# Patient Record
Sex: MALE | Race: BLACK OR AFRICAN AMERICAN | NOT HISPANIC OR LATINO | URBAN - METROPOLITAN AREA
[De-identification: names, ages, dates, MRNs, and addresses within clinical notes are randomized per-mention and may not be internally consistent; named-entity substitution may affect disease eponyms.]

---

## 2018-11-14 ENCOUNTER — EMERGENCY (EMERGENCY)
Facility: HOSPITAL | Age: 23
LOS: 1 days | Discharge: HOME | End: 2018-11-14
Attending: EMERGENCY MEDICINE
Payer: COMMERCIAL

## 2018-11-14 VITALS
RESPIRATION RATE: 18 BRPM | DIASTOLIC BLOOD PRESSURE: 74 MMHG | HEIGHT: 68 IN | OXYGEN SATURATION: 96 % | WEIGHT: 199.96 LBS | TEMPERATURE: 98 F | SYSTOLIC BLOOD PRESSURE: 128 MMHG | HEART RATE: 86 BPM

## 2018-11-14 PROCEDURE — 99283 EMERGENCY DEPT VISIT LOW MDM: CPT

## 2018-11-14 PROCEDURE — 99283 EMERGENCY DEPT VISIT LOW MDM: CPT | Mod: 25

## 2018-11-14 NOTE — ED ADULT TRIAGE NOTE - CHIEF COMPLAINT QUOTE
Patient a change in hearing around 30 minutes ago.  Pt states he is able to hear in his R ear, but states his hearing has become more "fuzzy".  Pt states he took a shower earlier, and thinks he may have gotten some water in his ear.  Pt states if I "pry open the ear it feels better".  Pt states he caught a cold a few days ago

## 2018-11-15 RX ORDER — OFLOXACIN OTIC SOLUTION 3 MG/ML
1 SOLUTION/ DROPS AURICULAR (OTIC) ONCE
Qty: 0 | Refills: 0 | Status: COMPLETED | OUTPATIENT
Start: 2018-11-15 | End: 2018-11-15

## 2018-11-15 RX ADMIN — OFLOXACIN OTIC SOLUTION 1 DROP(S): 3 SOLUTION/ DROPS AURICULAR (OTIC) at 00:28

## 2018-11-15 NOTE — ED ADULT NURSE NOTE - OBJECTIVE STATEMENT
22y/o Male presented to the ED from home with complaint of rigth ear pain. A&Ox3, ambulatory. Patient states that his hearing was fuzzy this morning when he woke up, Patient then took a shower and thinks he got water in his ears. Patient did not try and clean ear out after shower. Reports increase in fuzzy  sounds hrs after shower, states that he used a cindy pin to clean it out and made symptom worse. Repots mild discomfort of right ear. Patient denies fever, chills, N/V/D.

## 2018-11-15 NOTE — ED PROVIDER NOTE - PLAN OF CARE
1) Please return to the ED should you have any new or worsening symptoms, worsening pain, develop pain in ear, skin changes over the ear or just under the ear or any concerning symptoms  2) Please follow up with Ear, Nose, and Throat Physician - please call [phone] to make an appointment.   3) Please place 2-3 drops in R ear until told to discontinue by ear nose and throat MD (Until perforation heals)

## 2018-11-15 NOTE — ED PROVIDER NOTE - MEDICAL DECISION MAKING DETAILS
Gamal Delgado (MD): 24 y/o M pt with no significant PMHx presents to the ED for change in hearing today which worsened after inserting cindy pin. Small TM perforation at 6oclock. Discussed with pt to not to insert foreign body into ear. Plan: decongestion to decrease pressure and ENT f/u

## 2018-11-15 NOTE — ED PROVIDER NOTE - CARE PLAN
Principal Discharge DX:	Perforation of right tympanic membrane  Assessment and plan of treatment:	1) Please return to the ED should you have any new or worsening symptoms, worsening pain, develop pain in ear, skin changes over the ear or just under the ear or any concerning symptoms  2) Please follow up with Ear, Nose, and Throat Physician - please call [phone] to make an appointment.   3) Please place 2-3 drops in R ear until told to discontinue by ear nose and throat MD (Until perforation heals)

## 2018-11-15 NOTE — ED PROVIDER NOTE - ATTENDING CONTRIBUTION TO CARE
I have seen and evaluated this patient with the resident.   I agree with the findings  unless other wise stated.  I have made appropriate changes in documentations where needed, After my face to face bedside evaluation, I am further  [age] y/o M pt with no significant PMHx presents to the ED for change in hearing today which no pain ? trauma no ear d/c no bleeding  worsened after inserting cindy pin. Small TM perforation at 6oclock. Discussed with pt to not to insert foreign body into ear. Plan: decongestion to decrease pressure and ENT f/u --Anderson

## 2018-11-15 NOTE — ED PROVIDER NOTE - OBJECTIVE STATEMENT
Gamal Delgado) : 24 y/o M pt with no significant PMHx c/o change in hearing about 30 minutes ago. States that he is able to hear out of his right ear but notes more "fuzzy" sounding. Took a shower earlier and thinks he may have gotten some water in his ear. States that after his shower, he would usually clean out his ear after he showers but didn't do so this time. Then noted onset of sx started a few hours after the shower. Tried using cindy pin to clean it out which made it slightly worse. Notes that he had pain with swallowing x2 days. Denies fever, ear pain or any other complaints. NKDA

## 2019-05-31 ENCOUNTER — EMERGENCY (EMERGENCY)
Facility: HOSPITAL | Age: 24
LOS: 1 days | Discharge: HOME | End: 2019-05-31
Attending: EMERGENCY MEDICINE
Payer: COMMERCIAL

## 2019-05-31 VITALS
HEIGHT: 72 IN | DIASTOLIC BLOOD PRESSURE: 71 MMHG | TEMPERATURE: 98 F | SYSTOLIC BLOOD PRESSURE: 120 MMHG | OXYGEN SATURATION: 97 % | WEIGHT: 179.9 LBS | RESPIRATION RATE: 16 BRPM | HEART RATE: 70 BPM

## 2019-05-31 PROCEDURE — 99283 EMERGENCY DEPT VISIT LOW MDM: CPT | Mod: 25

## 2019-05-31 PROCEDURE — 94640 AIRWAY INHALATION TREATMENT: CPT

## 2019-05-31 RX ORDER — FLUTICASONE PROPIONATE 50 MCG
2 SPRAY, SUSPENSION NASAL ONCE
Refills: 0 | Status: COMPLETED | OUTPATIENT
Start: 2019-05-31 | End: 2019-05-31

## 2019-05-31 RX ADMIN — Medication 2 SPRAY(S): at 04:06

## 2019-05-31 NOTE — ED PROVIDER NOTE - OBJECTIVE STATEMENT
22 yo male presenting with 4 hour hx of ear fullness and pain on the right side that started while patient was showering.  took ibuprofen with mild transient relief of symptoms, described as fullness, moderate intensity with no radiation.  hx of this in the past.  states that he is dealing with upper respiratory infection.  denies fevers. 22 yo male presenting with 4 hour hx of ear fullness and pain on the right side that started while patient was showering.  took ibuprofen with mild transient relief of symptoms, described as fullness, moderate intensity with no radiation.  hx of this in the past.  states that he is dealing with upper respiratory infection.  denies fevers.    Attendinyo male presents with right ear fullness for 1 day.  feels like he has pressure in the ear that changes with head movement.  no pain with ear pulling.

## 2019-05-31 NOTE — ED PROVIDER NOTE - PHYSICAL EXAMINATION
gen: well appearing  Mentation: AAO x 3  psych: mood appropriate  ENT: airway patent, TM bulging with clear background on the right ear   Eyes: conjunctivae clear bilaterally  Cardio: RRR, no m/r/g  Resp: normal BS b/l  GI: s/nt/nd   MSK: normal movement of all extremities

## 2019-05-31 NOTE — ED ADULT NURSE NOTE - OBJECTIVE STATEMENT
23M c/o hearing loss to R ear. Patient states hx of swimmers ear in the past. Pt states that it feels similar to that incident. Patient requests ear drops. Pt is alert and oriented x4, calm/cooperative, NAD, VSS.

## 2019-05-31 NOTE — ED PROVIDER NOTE - NSFOLLOWUPCLINICS_GEN_ALL_ED_FT
[facility]  ENT  [address], [address]  [place] [address]  Phone: [phone]  Fax:   Follow Up Time: 7-10 Days

## 2019-05-31 NOTE — ED PROVIDER NOTE - NS ED ROS FT
Constitutional: no fever  Eyes: no conjunctivitis  Ears: + ear pain   Nose: no nasal congestion, Mouth/Throat: no throat pain, Neck: no stiffness  Cardiovascular: no chest pain  Chest: no cough  Gastrointestinal: no abdominal pain, no vomiting and diarrhea  MSK: no joint pain  : no dysuria  Skin: no rash  Neuro: no LOC

## 2019-05-31 NOTE — ED PROVIDER NOTE - NSFOLLOWUPINSTRUCTIONS_ED_ALL_ED_FT
Please follow up with an ear nose throat doctor within the next week in regards to your symptoms.  Please employ two nasal sprays into each nostril once a day for the next 5 days.    Drink at least 2 Liters or 64 Ounces of water each day.  Return for any persistent, worsening symptoms, or ANY concerns at all.

## 2020-01-17 ENCOUNTER — EMERGENCY (EMERGENCY)
Facility: HOSPITAL | Age: 25
LOS: 1 days | Discharge: HOME | End: 2020-01-17
Attending: EMERGENCY MEDICINE | Admitting: EMERGENCY MEDICINE
Payer: COMMERCIAL

## 2020-01-17 VITALS
HEART RATE: 87 BPM | TEMPERATURE: 98 F | OXYGEN SATURATION: 100 % | DIASTOLIC BLOOD PRESSURE: 59 MMHG | RESPIRATION RATE: 15 BRPM | SYSTOLIC BLOOD PRESSURE: 109 MMHG

## 2020-01-17 PROCEDURE — 99283 EMERGENCY DEPT VISIT LOW MDM: CPT

## 2020-01-17 NOTE — ED ADULT TRIAGE NOTE - CHIEF COMPLAINT QUOTE
Pt c/o cough/congestion, chills, and generalized body aches for the past two days.  Denies any sick contacts.  Took ibuprofen prior to arrival.  Appears comfortable. Denies any PMHx.

## 2020-01-18 VITALS
HEART RATE: 95 BPM | SYSTOLIC BLOOD PRESSURE: 138 MMHG | TEMPERATURE: 100 F | RESPIRATION RATE: 14 BRPM | DIASTOLIC BLOOD PRESSURE: 81 MMHG | OXYGEN SATURATION: 100 %

## 2020-01-18 PROCEDURE — 71046 X-RAY EXAM CHEST 2 VIEWS: CPT | Mod: 26

## 2020-01-18 RX ORDER — IBUPROFEN 200 MG
600 TABLET ORAL ONCE
Refills: 0 | Status: COMPLETED | OUTPATIENT
Start: 2020-01-18 | End: 2020-01-18

## 2020-01-18 RX ADMIN — Medication 600 MILLIGRAM(S): at 02:13

## 2020-01-18 NOTE — ED PROVIDER NOTE - PHYSICAL EXAMINATION
GENERAL: no acute distress, non-toxic appearing  HEENT: normal conjunctiva, oral mucosa moist, neck supple, nonerythematous oropharynx  CARDIAC: regular rate and rhythm, normal S1 and S2,  no appreciable murmurs  PULM: clear to ascultation bilaterally, no crackles, rales, rhonchi, or wheezing  GI: abdomen nondistended, soft, nontender, no guarding or rebound tenderness  : no CVA tenderness, no suprapubic tenderness  NEURO: alert and oriented x 3, normal speech, PERRL, EOMI, CN3-12 grossly intact, no focal motor or sensory deficits, nonantalgic gait  MSK: no visible deformities, no peripheral edema, calf tenderness/redness/swelling  SKIN: no visible rashes, dry, well-perfused  PSYCH: appropriate mood and affect

## 2020-01-18 NOTE — ED PROVIDER NOTE - CLINICAL SUMMARY MEDICAL DECISION MAKING FREE TEXT BOX
24M with flu-like symptoms. Didn't get flu shot, + sick contacts. Tylenol helped a little. Vitals reassuring. Exam shows comfortable appearing pt, clear lungs, nonerythematous oropharynx. Likely viral syndrome, possible flu. Will give ibuprofen and do CXR. Reassess.

## 2020-01-18 NOTE — ED PROVIDER NOTE - ATTENDING CONTRIBUTION TO CARE
25 y/o healthy M with 1 day of URI sxs - cough, congestion, headaches, myalgias, fatigue, sore throat.  Pt took tylenol earlier with mild relief.  No associated fever, chills, cp, sob, abd pain, n/v/d, dysuria, rash, neck pain or stiffness.  (+)sick contacts  No recent travel.  Well appearing, lying comfortably in stretcher, awake and alert, nontoxic.  AF/VSS.  NCAT EOMI PERRL, post oropharynx clear, no nuchal rigidity or meningismus.  Lungs cta bl.  Cards nl S1/S2, RRR, no MRG.  Abd soft ntnd.  No pedal edema or calf tenderness.  Pt with 1 day URI sxs, no concerning features on hx and exam, will rx symptomatically, dc home with instructions on supportive care.

## 2020-01-18 NOTE — ED PROVIDER NOTE - OBJECTIVE STATEMENT
Patient is a 24 year old male with no PMH presenting with chills, body aches, cough, congestion, headache since yesterday morning. Cough is productive of clear sputum. He tried taking tylenol with little relief. States he has a friend who has similar symptoms and may have got it from them. Denies any blurry vision, weakness or numbness in extremities, chest pain, sob, palpitations, abd pain, n/v/d, urinary symptoms.

## 2020-01-18 NOTE — ED ADULT NURSE NOTE - OBJECTIVE STATEMENT
Pt. presents to room 8, a&ox4, ambulatory, and self-care. Pt. c/o headache that increases in pain as he moves his head from side to side. Pt. states he has had a productive cough and runny nose for the past two days. Pt. denies any nausea, vomiting, SOB, chest pain, diarrhea, or weakness. Respirations even and unlabored. Pt. denies any PMHx. Pt. denies having any sick contacts or recent travel. VS as noted. Awaiting further orders. Will continue to monitor.

## 2020-01-18 NOTE — ED PROVIDER NOTE - NSFOLLOWUPINSTRUCTIONS_ED_ALL_ED_FT
Your diagnosis: Viral Syndrome    Discharge instructions:    - Please follow up with your Primary Care Doctor.    - Tylenol up to 650 mg every 8 hours as needed for pain and/or Motrin up to 600 mg every 6 hours as needed for pain and/or fever.    - Drink Plenty fluids.    - Be sure to return to the ED if you develop new or worsening symptoms. Specific signs and symptoms to be vigilant of: fever or chills, chest pain, difficulty breathing, palpitations, loss of consciousness, headache, vision changes, slurred speech, difficulty swallowing or drooling, facial droop, weakness in the arms or legs, numbness or tingling, abdominal pain, nausea or vomiting, diarrhea, constipation, blood in the stool or urine, pain on urination, difficulty urinating or any other distressing symptoms.

## 2020-01-18 NOTE — ED PROVIDER NOTE - PATIENT PORTAL LINK FT
You can access the FollowMyHealth Patient Portal offered by [facility] by registering at the following website: [email or web address]. By joining [employer]’s FollowMyHealth portal, you will also be able to view your health information using other applications (apps) compatible with our system.
